# Patient Record
Sex: MALE | Race: WHITE | URBAN - METROPOLITAN AREA
[De-identification: names, ages, dates, MRNs, and addresses within clinical notes are randomized per-mention and may not be internally consistent; named-entity substitution may affect disease eponyms.]

---

## 2017-03-04 ENCOUNTER — HOSPITAL ENCOUNTER (EMERGENCY)
Facility: CLINIC | Age: 32
Discharge: HOME | End: 2017-03-05
Attending: EMERGENCY MEDICINE | Admitting: EMERGENCY MEDICINE
Payer: COMMERCIAL

## 2017-03-04 DIAGNOSIS — R47.81 SLURRED SPEECH: ICD-10-CM

## 2017-03-04 DIAGNOSIS — R11.2 NON-INTRACTABLE VOMITING WITH NAUSEA, UNSPECIFIED VOMITING TYPE: ICD-10-CM

## 2017-03-04 LAB
ALBUMIN SERPL-MCNC: 5.1 G/DL (ref 3.4–5)
ALP SERPL-CCNC: 56 U/L (ref 40–150)
ALT SERPL W P-5'-P-CCNC: 23 U/L (ref 0–70)
ANION GAP SERPL CALCULATED.3IONS-SCNC: 15 MMOL/L (ref 3–14)
AST SERPL W P-5'-P-CCNC: 18 U/L (ref 0–45)
BASOPHILS # BLD AUTO: 0 10E9/L (ref 0–0.2)
BASOPHILS NFR BLD AUTO: 0.3 %
BILIRUB SERPL-MCNC: 1.3 MG/DL (ref 0.2–1.3)
BUN SERPL-MCNC: 16 MG/DL (ref 7–30)
CALCIUM SERPL-MCNC: 9.2 MG/DL (ref 8.5–10.1)
CHLORIDE SERPL-SCNC: 104 MMOL/L (ref 94–109)
CO2 SERPL-SCNC: 21 MMOL/L (ref 20–32)
CREAT SERPL-MCNC: 1.04 MG/DL (ref 0.66–1.25)
DIFFERENTIAL METHOD BLD: ABNORMAL
EOSINOPHIL # BLD AUTO: 0 10E9/L (ref 0–0.7)
EOSINOPHIL NFR BLD AUTO: 0.1 %
ERYTHROCYTE [DISTWIDTH] IN BLOOD BY AUTOMATED COUNT: 12.9 % (ref 10–15)
ETHANOL SERPL-MCNC: <0.01 G/DL
GFR SERPL CREATININE-BSD FRML MDRD: 82 ML/MIN/1.7M2
GLUCOSE SERPL-MCNC: 190 MG/DL (ref 70–99)
HBA1C MFR BLD: 4.8 % (ref 4.3–6)
HCT VFR BLD AUTO: 51 % (ref 40–53)
HGB BLD-MCNC: 17.2 G/DL (ref 13.3–17.7)
IMM GRANULOCYTES # BLD: 0 10E9/L (ref 0–0.4)
IMM GRANULOCYTES NFR BLD: 0.3 %
LYMPHOCYTES # BLD AUTO: 0.4 10E9/L (ref 0.8–5.3)
LYMPHOCYTES NFR BLD AUTO: 2.9 %
MAGNESIUM SERPL-MCNC: 1.6 MG/DL (ref 1.6–2.3)
MCH RBC QN AUTO: 28.7 PG (ref 26.5–33)
MCHC RBC AUTO-ENTMCNC: 33.7 G/DL (ref 31.5–36.5)
MCV RBC AUTO: 85 FL (ref 78–100)
MONOCYTES # BLD AUTO: 0.3 10E9/L (ref 0–1.3)
MONOCYTES NFR BLD AUTO: 2.8 %
NEUTROPHILS # BLD AUTO: 11.2 10E9/L (ref 1.6–8.3)
NEUTROPHILS NFR BLD AUTO: 93.6 %
NRBC # BLD AUTO: 0 10*3/UL
NRBC BLD AUTO-RTO: 0 /100
PLATELET # BLD AUTO: 228 10E9/L (ref 150–450)
POTASSIUM SERPL-SCNC: 3.2 MMOL/L (ref 3.4–5.3)
PROT SERPL-MCNC: 8.6 G/DL (ref 6.8–8.8)
RBC # BLD AUTO: 5.99 10E12/L (ref 4.4–5.9)
SODIUM SERPL-SCNC: 140 MMOL/L (ref 133–144)
TROPONIN I SERPL-MCNC: NORMAL UG/L (ref 0–0.04)
WBC # BLD AUTO: 12 10E9/L (ref 4–11)

## 2017-03-04 PROCEDURE — 85025 COMPLETE CBC W/AUTO DIFF WBC: CPT | Performed by: EMERGENCY MEDICINE

## 2017-03-04 PROCEDURE — 84484 ASSAY OF TROPONIN QUANT: CPT | Performed by: EMERGENCY MEDICINE

## 2017-03-04 PROCEDURE — 96361 HYDRATE IV INFUSION ADD-ON: CPT

## 2017-03-04 PROCEDURE — 80053 COMPREHEN METABOLIC PANEL: CPT | Performed by: EMERGENCY MEDICINE

## 2017-03-04 PROCEDURE — 93005 ELECTROCARDIOGRAM TRACING: CPT

## 2017-03-04 PROCEDURE — 83036 HEMOGLOBIN GLYCOSYLATED A1C: CPT | Performed by: EMERGENCY MEDICINE

## 2017-03-04 PROCEDURE — 99285 EMERGENCY DEPT VISIT HI MDM: CPT | Mod: 25

## 2017-03-04 PROCEDURE — A9585 GADOBUTROL INJECTION: HCPCS | Performed by: EMERGENCY MEDICINE

## 2017-03-04 PROCEDURE — 80320 DRUG SCREEN QUANTALCOHOLS: CPT | Performed by: EMERGENCY MEDICINE

## 2017-03-04 PROCEDURE — 25500064 ZZH RX 255 OP 636: Performed by: EMERGENCY MEDICINE

## 2017-03-04 PROCEDURE — 83735 ASSAY OF MAGNESIUM: CPT | Performed by: EMERGENCY MEDICINE

## 2017-03-04 PROCEDURE — 25000128 H RX IP 250 OP 636: Performed by: EMERGENCY MEDICINE

## 2017-03-04 PROCEDURE — 96374 THER/PROPH/DIAG INJ IV PUSH: CPT

## 2017-03-04 RX ORDER — GADOBUTROL 604.72 MG/ML
10 INJECTION INTRAVENOUS ONCE
Status: DISCONTINUED | OUTPATIENT
Start: 2017-03-04 | End: 2017-03-04 | Stop reason: CLARIF

## 2017-03-04 RX ORDER — GADOBUTROL 604.72 MG/ML
7.5 INJECTION INTRAVENOUS ONCE
Status: COMPLETED | OUTPATIENT
Start: 2017-03-04 | End: 2017-03-04

## 2017-03-04 RX ORDER — ONDANSETRON 2 MG/ML
4 INJECTION INTRAMUSCULAR; INTRAVENOUS ONCE
Status: COMPLETED | OUTPATIENT
Start: 2017-03-04 | End: 2017-03-04

## 2017-03-04 RX ADMIN — SODIUM CHLORIDE 1000 ML: 9 INJECTION, SOLUTION INTRAVENOUS at 22:17

## 2017-03-04 RX ADMIN — GADOBUTROL 7.5 ML: 604.72 INJECTION INTRAVENOUS at 11:25

## 2017-03-04 RX ADMIN — ONDANSETRON 4 MG: 2 INJECTION INTRAMUSCULAR; INTRAVENOUS at 21:46

## 2017-03-04 ASSESSMENT — ENCOUNTER SYMPTOMS
VOMITING: 1
CHILLS: 1
SPEECH DIFFICULTY: 1
NAUSEA: 1
NECK PAIN: 0
HEADACHES: 1

## 2017-03-04 NOTE — LETTER
[facility] EMERGENCY DEPARTMENT  [address]  [facility] [address]  [phone]      2017    Fredo Bruno  [address]  [facility] [id number] [phone] (home) NONE (work)    : 1985      To Whom it may concern:    Fredo Bruno was seen in our Emergency Department today, 2017.  Return to work is based on symptom improvement expected in 1-3 days.      Sincerely,      Michelle Da Silva MD

## 2017-03-04 NOTE — ED AVS SNAPSHOT
[facility] Emergency Department    [address]    [facility] [address]    Phone:  [phone]    Fax:  [phone]                                       Fredo Bruno   MRN: [id number]    Department:  [facility] Emergency Department   Date of Visit:  3/4/2017           Patient Information     Date Of Birth          1985        Your diagnoses for this visit were:     Non-intractable vomiting with nausea, unspecified vomiting type     Slurred speech, resolved        You were seen by Cely Frank MD and Michelle Da Silva MD.      Follow-up Information     Follow up with In clinic.    Contact information:    See list        Follow up with [facility] Emergency Department.    Specialty:  EMERGENCY MEDICINE    Why:  As needed, If symptoms worsen    Contact information:    [address]  [facility] [id number] [phone]        Discharge Instructions         Discharge Instructions  Vomiting    You have been seen today for vomiting. This is usually caused by a virus, but some bacteria, parasites, medicines or other medical conditions can cause similar symptoms. At this time your doctor does not find that your vomiting is a sign of anything dangerous or life-threatening. However, sometimes the signs of serious illness do not show up right away. If you have new or worse symptoms, you may need to be seen again in the emergency department or by your primary doctor. Remember that serious problems like appendicitis can start as vomiting.     Return to the Emergency Department if:    You keep throwing up and you are not able to keep liquids down.     You feel you are getting dehydrated, such as being very thirsty, not urinating at least every 8-12 hours, or feeling faint or lightheaded.     You develop a new fever, or your fever continues for more than 2 days.     You have belly pain that seems worse than cramps, is in one spot, or is getting  worse over time.     You have blood in your vomit or stools.     You feel very weak.    You are not starting to improve within 24 hours of your visit here.     What can I do to help myself?    The most important thing to do is to drink clear liquids. If you have been vomiting a lot, it is best to have only small, frequent sips of liquids. Drinking too much at once may cause more vomiting. If you are vomiting often, you must replace minerals, sodium and potassium lost with your illness. Pedialyte  and sports drinks can help you replace these minerals. You can also drink clear liquids such as water, weak tea, apple juice, and 7-Up . Avoid acid liquids (orange), caffeine (coffee) or alcohol. Do not drink milk until you no longer have diarrhea.     After liquids are staying down, you may start eating mild foods. Soda crackers, toast, plain noodles, gelatin, applesauce and bananas are good first choices. Avoid foods that have acid, are spicy, fatty or have a lot of fiber (such as meats, coarse grains, vegetables). You may start eating these foods again in about 3 days when you are better.     Sometimes treatment includes prescription medicine to prevent nausea and vomiting. If your doctor prescribes these for you, take them as directed.     Don t take ibuprofen, or other nonsteroidal anti-inflammatory medicines without checking with your healthcare provider.   If you were given a prescription for medicine here today, be sure to read all of the information (including the package insert) that comes with your prescription.  This will include important information about the medicine, its side effects, and any warnings that you need to know about.  The pharmacist who fills the prescription can provide more information and answer questions you may have about the medicine.  If you have questions or concerns that the pharmacist cannot address, please call or return to the Emergency Department.     Discharge  Instructions  Headache    You were seen today for a headache. Headaches may be caused by many different things such as muscle tension, sinus inflammation, anxiety and stress, having too little sleep, too much alcohol, some medical conditions or injury. You may have a migraine, which is caused by changes in the blood vessels in your head.  At this time your doctor does not find that your headache is a sign of anything dangerous or life-threatening.  However, sometimes the signs of serious illness do not show up right away.  If you have new or worse symptoms, you may need to be seen again in the emergency department or by your primary doctor.      Return to the Emergency Department if:    You get a fever of 101 F or higher.    Your headache gets much worse.    You get a stiff neck with your headache.    You get a new headache that is different or worse than headaches you have had before.    You are vomiting and can t keep food or water down.    You have blurry or double vision or other problems with your eyes.    You have a new weakness on one side of your body.    You have difficulty with balance which is new.    You or your family thinks you are confused.    You have a seizure or convulsion.    What can I do to help myself?    Pain medications - You may take a pain medication such as Tylenol  (acetaminophen), Advil , Nuprin  (ibuprofen) or Aleve  (naproxen).  If you have been given a narcotic such as Vicodin  (hydrocodone with acetaminophen), Percocet  (oxycodone with acetaminophen), codeine, or a muscle relaxant such as Flexeril  (cyclobenzaprine) or Soma  (carisoprodol), do not drive for four hours after you have taken it. If the narcotic contains Tylenol  (acetaminophen), do not take Tylenol  with it. All narcotics will cause constipation, so eat a high fiber diet.        Take a pain reliever as soon as you notice symptoms.  Starting medications as soon as you start to have symptoms may lessen the amount of pain  you have.    Relaxing in a quiet, dark room may help.    Get enough sleep and eat meals regularly.    Schedule an appointment with your primary physician as instructed, or at least within 1 week.    You may need to watch for certain foods or other things which may trigger your headaches.  Keeping a journal of your headaches and possible triggers may help you and your primary doctor to identify things which you should avoid which may be causing your headaches.  If you were given a prescription for medicine here today, be sure to read all of the information (including the package insert) that comes with your prescription.  This will include important information about the medicine, its side effects, and any warnings that you need to know about.  The pharmacist who fills the prescription can provide more information and answer questions you may have about the medicine.  If you have questions or concerns that the pharmacist cannot address, please call or return to the Emergency Department.       Remember that you can always come back to the Emergency Department if you are not able to see your regular doctor in the amount of time listed above, if you get any new symptoms, or if there is anything that worries you.              24 Hour Appointment Hotline       To make an appointment at any [facility], call [phone] ([phone]). If you don't have a family doctor or clinic, we will help you find one. [facility] are conveniently located to serve the needs of you and your family.             Review of your medicines      START taking        Dose / Directions Last dose taken    ondansetron 4 MG ODT tab   Commonly known as:  ZOFRAN ODT   Dose:  4-8 mg   Quantity:  12 tablet        Take 1-2 tablets (4-8 mg) by mouth every 8 hours as needed for nausea   Refills:  0                Prescriptions were sent or printed at these locations (1 Prescription)                   Other Prescriptions                 Printed at Department/Unit printer (1 of 1)         ondansetron (ZOFRAN ODT) 4 MG ODT tab                Procedures and tests performed during your visit     Alcohol ethyl    CBC with platelets differential    Comprehensive metabolic panel    EKG 12 lead    Hemoglobin A1c    MR Brain w/o & w Contrast    Magnesium    Troponin I      Orders Needing Specimen Collection     None      Pending Results     Date and Time Order Name Status Description    3/4/2017 2139 MR Brain w/o & w Contrast In process     3/4/2017 2104 EKG 12 lead Preliminary             Pending Culture Results     No orders found for last 3 day(s).             Test Results from your hospital stay     3/4/2017  9:52 PM - Interface, Flexilab Results      Component Results     Component Value Ref Range & Units Status    WBC 12.0 (H) 4.0 - 11.0 10e9/L Final    RBC Count 5.99 (H) 4.4 - 5.9 10e12/L Final    Hemoglobin 17.2 13.3 - 17.7 g/dL Final    Hematocrit 51.0 40.0 - 53.0 % Final    MCV 85 78 - 100 fl Final    MCH 28.7 26.5 - 33.0 pg Final    MCHC 33.7 31.5 - 36.5 g/dL Final    RDW 12.9 10.0 - 15.0 % Final    Platelet Count 228 150 - 450 10e9/L Final    Diff Method Automated Method  Final    % Neutrophils 93.6 % Final    % Lymphocytes 2.9 % Final    % Monocytes 2.8 % Final    % Eosinophils 0.1 % Final    % Basophils 0.3 % Final    % Immature Granulocytes 0.3 % Final    Nucleated RBCs 0 0 /100 Final    Absolute Neutrophil 11.2 (H) 1.6 - 8.3 10e9/L Final    Absolute Lymphocytes 0.4 (L) 0.8 - 5.3 10e9/L Final    Absolute Monocytes 0.3 0.0 - 1.3 10e9/L Final    Absolute Eosinophils 0.0 0.0 - 0.7 10e9/L Final    Absolute Basophils 0.0 0.0 - 0.2 10e9/L Final    Abs Immature Granulocytes 0.0 0 - 0.4 10e9/L Final    Absolute Nucleated RBC 0.0  Final         3/4/2017 10:13 PM - Interface, Flexilab Results      Component Results     Component Value Ref Range & Units Status    Sodium 140 133 - 144 mmol/L Final    Potassium 3.2 (L) 3.4 - 5.3 mmol/L Final    Chloride  104 94 - 109 mmol/L Final    Carbon Dioxide 21 20 - 32 mmol/L Final    Anion Gap 15 (H) 3 - 14 mmol/L Final    Glucose 190 (H) 70 - 99 mg/dL Final    Urea Nitrogen 16 7 - 30 mg/dL Final    Creatinine 1.04 0.66 - 1.25 mg/dL Final    GFR Estimate 82 >60 mL/min/1.7m2 Final    Non  GFR Calc    GFR Estimate If Black >90   GFR Calc   >60 mL/min/1.7m2 Final    Calcium 9.2 8.5 - 10.1 mg/dL Final    Bilirubin Total 1.3 0.2 - 1.3 mg/dL Final    Albumin 5.1 (H) 3.4 - 5.0 g/dL Final    Protein Total 8.6 6.8 - 8.8 g/dL Final    Alkaline Phosphatase 56 40 - 150 U/L Final    ALT 23 0 - 70 U/L Final    AST 18 0 - 45 U/L Final         3/4/2017 10:13 PM - Interface, Flexilab Results      Component Results     Component Value Ref Range & Units Status    Troponin I ES  0.000 - 0.045 ug/L Final    <0.015  The 99th percentile for upper reference range is 0.045 ug/L.  Troponin values in   the range of 0.045 - 0.120 ug/L may be associated with risks of adverse   clinical events.           3/5/2017 12:09 AM - Burak Powell      Result not yet available     This result will become available once it has been finished.         3/4/2017 10:13 PM - Interface, Flexilab Results      Component Results     Component Value Ref Range & Units Status    Magnesium 1.6 1.6 - 2.3 mg/dL Final         3/4/2017 10:13 PM - Interface, Flexilab Results      Component Results     Component Value Ref Range & Units Status    Ethanol g/dL <0.01 <0.01 g/dL Final         3/4/2017 10:37 PM - Interface, Flexilab Results      Component Results     Component Value Ref Range & Units Status    Hemoglobin A1C 4.8 4.3 - 6.0 % Final                Clinical Quality Measure: Blood Pressure Screening     Your blood pressure was checked while you were in the emergency department today. The last reading we obtained was  BP: 96/54 . Please read the guidelines below about what these numbers mean and what you should do about them.  If your systolic  "blood pressure (the top number) is less than 120 and your diastolic blood pressure (the bottom number) is less than 80, then your blood pressure is normal. There is nothing more that you need to do about it.  If your systolic blood pressure (the top number) is 120-139 or your diastolic blood pressure (the bottom number) is 80-89, your blood pressure may be higher than it should be. You should have your blood pressure rechecked within a year by a primary care provider.  If your systolic blood pressure (the top number) is 140 or greater or your diastolic blood pressure (the bottom number) is 90 or greater, you may have high blood pressure. High blood pressure is treatable, but if left untreated over time it can put you at risk for heart attack, stroke, or kidney failure. You should have your blood pressure rechecked by a primary care provider within the next 4 weeks.  If your provider in the emergency department today gave you specific instructions to follow-up with your doctor or provider even sooner than that, you should follow that instruction and not wait for up to 4 weeks for your follow-up visit.        Thank you for choosing [facility]       Thank you for choosing [facility] for your care. Our goal is always to provide you with excellent care. Hearing back from our patients is one way we can continue to improve our services. Please take a few minutes to complete the written survey that you may receive in the mail after you visit with us. Thank you!        [facility] Information     [facility] lets you send messages to your doctor, view your test results, renew your prescriptions, schedule appointments and more. To sign up, go to [email or web address] . Click on \"Log in\" on the left side of the screen, which will take you to the Welcome page. Then click on \"Sign up Now\" on the right side of the page.     You will be asked to enter the access code listed below, as well as some personal information. Please follow the " directions to create your username and password.     Your access code is: [email or web address]  Expires: 6/3/2017  1:39 AM     Your access code will  in 90 days. If you need help or a new code, please call your [facility] clinic or [phone].        Care EveryWhere ID     This is your Care EveryWhere ID. This could be used by other organizations to access your [facility] medical records  [id number]        After Visit Summary       This is your record. Keep this with you and show to your community pharmacist(s) and doctor(s) at your next visit.

## 2017-03-04 NOTE — ED AVS SNAPSHOT
[facility] Emergency Department    [address]    [facility] [address]    Phone:  [phone]    Fax:  [phone]                                       Fredo Bruno   MRN: [id number]    Department:  [facility] Emergency Department   Date of Visit:  3/4/2017           After Visit Summary Signature Page     I have received my discharge instructions, and my questions have been answered. I have discussed any challenges I see with this plan with the nurse or doctor.    ..........................................................................................................................................  Patient/Patient Representative Signature      ..........................................................................................................................................  Patient Representative Print Name and Relationship to Patient    ..................................................               ................................................  Date                                            Time    ..........................................................................................................................................  Reviewed by Signature/Title    ...................................................              ..............................................  Date                                                            Time

## 2017-03-05 ENCOUNTER — APPOINTMENT (OUTPATIENT)
Dept: MRI IMAGING | Facility: CLINIC | Age: 32
End: 2017-03-05
Attending: EMERGENCY MEDICINE
Payer: COMMERCIAL

## 2017-03-05 VITALS
OXYGEN SATURATION: 94 % | TEMPERATURE: 97.5 F | SYSTOLIC BLOOD PRESSURE: 96 MMHG | DIASTOLIC BLOOD PRESSURE: 54 MMHG | RESPIRATION RATE: 16 BRPM

## 2017-03-05 LAB — INTERPRETATION ECG - MUSE: NORMAL

## 2017-03-05 PROCEDURE — 70553 MRI BRAIN STEM W/O & W/DYE: CPT

## 2017-03-05 RX ORDER — ONDANSETRON 4 MG/1
4-8 TABLET, ORALLY DISINTEGRATING ORAL EVERY 8 HOURS PRN
Qty: 12 TABLET | Refills: 0 | Status: SHIPPED | OUTPATIENT
Start: 2017-03-05 | End: 2017-03-08

## 2017-03-05 NOTE — DISCHARGE INSTRUCTIONS
Discharge Instructions  Vomiting    You have been seen today for vomiting. This is usually caused by a virus, but some bacteria, parasites, medicines or other medical conditions can cause similar symptoms. At this time your doctor does not find that your vomiting is a sign of anything dangerous or life-threatening. However, sometimes the signs of serious illness do not show up right away. If you have new or worse symptoms, you may need to be seen again in the emergency department or by your primary doctor. Remember that serious problems like appendicitis can start as vomiting.     Return to the Emergency Department if:    You keep throwing up and you are not able to keep liquids down.     You feel you are getting dehydrated, such as being very thirsty, not urinating at least every 8-12 hours, or feeling faint or lightheaded.     You develop a new fever, or your fever continues for more than 2 days.     You have belly pain that seems worse than cramps, is in one spot, or is getting worse over time.     You have blood in your vomit or stools.     You feel very weak.    You are not starting to improve within 24 hours of your visit here.     What can I do to help myself?    The most important thing to do is to drink clear liquids. If you have been vomiting a lot, it is best to have only small, frequent sips of liquids. Drinking too much at once may cause more vomiting. If you are vomiting often, you must replace minerals, sodium and potassium lost with your illness. Pedialyte  and sports drinks can help you replace these minerals. You can also drink clear liquids such as water, weak tea, apple juice, and 7-Up . Avoid acid liquids (orange), caffeine (coffee) or alcohol. Do not drink milk until you no longer have diarrhea.     After liquids are staying down, you may start eating mild foods. Soda crackers, toast, plain noodles, gelatin, applesauce and bananas are good first choices. Avoid foods that have acid, are spicy,  fatty or have a lot of fiber (such as meats, coarse grains, vegetables). You may start eating these foods again in about 3 days when you are better.     Sometimes treatment includes prescription medicine to prevent nausea and vomiting. If your doctor prescribes these for you, take them as directed.     Don t take ibuprofen, or other nonsteroidal anti-inflammatory medicines without checking with your healthcare provider.   If you were given a prescription for medicine here today, be sure to read all of the information (including the package insert) that comes with your prescription.  This will include important information about the medicine, its side effects, and any warnings that you need to know about.  The pharmacist who fills the prescription can provide more information and answer questions you may have about the medicine.  If you have questions or concerns that the pharmacist cannot address, please call or return to the Emergency Department.     Discharge Instructions  Headache    You were seen today for a headache. Headaches may be caused by many different things such as muscle tension, sinus inflammation, anxiety and stress, having too little sleep, too much alcohol, some medical conditions or injury. You may have a migraine, which is caused by changes in the blood vessels in your head.  At this time your doctor does not find that your headache is a sign of anything dangerous or life-threatening.  However, sometimes the signs of serious illness do not show up right away.  If you have new or worse symptoms, you may need to be seen again in the emergency department or by your primary doctor.      Return to the Emergency Department if:    You get a fever of 101 F or higher.    Your headache gets much worse.    You get a stiff neck with your headache.    You get a new headache that is different or worse than headaches you have had before.    You are vomiting and can t keep food or water down.    You have blurry  or double vision or other problems with your eyes.    You have a new weakness on one side of your body.    You have difficulty with balance which is new.    You or your family thinks you are confused.    You have a seizure or convulsion.    What can I do to help myself?    Pain medications - You may take a pain medication such as Tylenol  (acetaminophen), Advil , Nuprin  (ibuprofen) or Aleve  (naproxen).  If you have been given a narcotic such as Vicodin  (hydrocodone with acetaminophen), Percocet  (oxycodone with acetaminophen), codeine, or a muscle relaxant such as Flexeril  (cyclobenzaprine) or Soma  (carisoprodol), do not drive for four hours after you have taken it. If the narcotic contains Tylenol  (acetaminophen), do not take Tylenol  with it. All narcotics will cause constipation, so eat a high fiber diet.        Take a pain reliever as soon as you notice symptoms.  Starting medications as soon as you start to have symptoms may lessen the amount of pain you have.    Relaxing in a quiet, dark room may help.    Get enough sleep and eat meals regularly.    Schedule an appointment with your primary physician as instructed, or at least within 1 week.    You may need to watch for certain foods or other things which may trigger your headaches.  Keeping a journal of your headaches and possible triggers may help you and your primary doctor to identify things which you should avoid which may be causing your headaches.  If you were given a prescription for medicine here today, be sure to read all of the information (including the package insert) that comes with your prescription.  This will include important information about the medicine, its side effects, and any warnings that you need to know about.  The pharmacist who fills the prescription can provide more information and answer questions you may have about the medicine.  If you have questions or concerns that the pharmacist cannot address, please call or return  to the Emergency Department.       Remember that you can always come back to the Emergency Department if you are not able to see your regular doctor in the amount of time listed above, if you get any new symptoms, or if there is anything that worries you.

## 2017-03-05 NOTE — ED PROVIDER NOTES
History     Chief Complaint:  Nausea and Vomiting  Slurred Speech      HPI   Fredo Bruno is a 31 year old male, with a history of migraines, who presents with nausea and vomiting as well as slurred speech. The patient reports that he was shopping with his mom today and ate at [employer] for lunch. He was feeling fine until he got home around 1700 and began vomiting persistently for about 2 hours. He was eventually able to sip ice cold water and eat ice chips prior to arrival here. During this period of vomiting, the patient reports that his limbs locked, his left eye was difficult to open, and he was slurring his speech. He denies ever losing consciousness. On evaluation, he reports a slight frontal headache.  This is much less severe than his migraines and there is no photophobia.  He denies neck pain. Most of his symptoms have resolved although he still feels chilled.  He did not take any medications for it.     Allergies:  Shellfish-derived products    Medications:    The patient is currently on no regular medications.       Past Medical History:    Migraines    Past Surgical History:    Appendectomy    Family History:    Father - MI in his 60s  Mother - Stroke at age 38    Social History:  Marital status: single  Never smoker, negative for alcohol use.  The patient presents with mother.    Review of Systems   Constitutional: Positive for chills.   Gastrointestinal: Positive for nausea and vomiting.   Musculoskeletal: Negative for neck pain.   Neurological: Positive for speech difficulty and headaches.        Negative for loss of consciousness.   All other systems reviewed and are negative.    Physical Exam   First Vitals:  BP: 127/81  Heart Rate: 87  Temp: 97.5  F (36.4  C)  Resp: 16  SpO2: 98 %      Physical Exam  General: Cooperative, lights on in room  Head:  The scalp, face, and head appear normal and symmetric  Eyes:  Sclera white; PERRL; EOMI  ENT:    External ears and nares normal  Neck:  No  meningismus or bruit, full ROM  CV:  Regular rate and rhythm  No murmur   Resp:  Breath sounds clear and equal bilaterally  GI:  Abdomen is soft, non-tender, non-distended  MS:  Moves all extremities  Neuro: Speech is normal and fluent. No apparent deficit    Strength 5/5 x4.  Sensation intact x4.      Cranial nerves II-XII intact by examination.    [employer] Stroke Scale  Exam Interval: Baseline   Score    Level of consciousness: (0)   Alert, keenly responsive    LOC questions: (0)   Answers both questions correctly    LOC commands: (0)   Performs both tasks correctly    Best gaze: (0)   Normal    Visual: (0)   No visual loss    Facial palsy: (0)   Normal symmetrical movements    Motor arm (left): (0)   No drift    Motor arm (right): (0)   No drift    Motor leg (left): (0)   No drift    Motor leg (right): (0)   No drift    Limb ataxia: (0)   Absent    Sensory: (0)   Normal- no sensory loss    Best language: (0)   Normal- no aphasia    Dysarthria: (0)   Normal    Extinction and inattention: (0)   No abnormality        Total Score:  0           Emergency Department Course     Imaging:  Radiographic findings were communicated with the patient who voiced understanding of the findings.    MR Brain, with and without contrast, per radiology:   Formal read pending at time of discharge    Laboratory:  CBC: WBC 12 (H) HGB 17.2 (WNL)  (WNL) RBC 5.99 (H) Absolute Neutrophil 11.2 (H) Absolute Lymphocytes 0.4 (L)  CMP: Creatinine 1.04 (WNL) Glucose 190 (H) Potassium 3.2 (L) Anion Gap 15 (H) Albumin 5.1 (H) Rest WNL    2100: Troponin I: <0.015 (WNL)    Magnesium: 1.6 (WNL)   Alcohol ethyl: <0.01 (WNL)  Hemoglobin A1c: 4.8 (WNL)    Interventions:  2146: Zofran, 4 mg, IV injection  2217: Normal Saline, 1000 mL, IV injection     ED Course:  Nursing notes and vitals reviewed.  I performed an exam of the patient as documented above.     0055: I checked in with and updated the patient.  0105: I spoke to  radiology at the [facility] who kindly previewed the patient's MRI scan and was able to at least confirm that it was negative for stroke.   0117: I spoke to [facility].  0120: I discussed the plan with the patient. He would like to go home.     I personally reviewed the laboratory and imaging results with the patient and answered all related questions prior to discharge.   Findings and plan explained to the patient. Patient discharged home with instructions regarding supportive care, medications, and reasons to return. The importance of close follow-up was reviewed.     Impression & Plan      Medical Decision Making:  Fredo Bruno is a 31 year old male who presents for evaluation of vomiting followed by transient neurologic symptoms. He has a mild headache and certainly not as severe as he has had with migraines in the past. Based on his transient neurologic symptoms, I am concerned for underlying stroke or mass and therefore MRI was obtained.  Complex migraine also possible.  Blood work is consistent with dehydration. Glucose is noted to be elevated, but hemoglobin A1c was added on and returned normal. This may have been a stress response.  He was treated with IV fluids and antiemetics and was able to PO challenge afterwards. Unfortunately, our system that allows us to upload neuroimaging to our night radiologist had malfunctioned. I discussed this with the MRI tech and he has made calls to IT to work on this. In the meantime, I consulted radiology at the [facility] to see if they would be able to tell me anything about the images. They were willing to quickly look through them, but not to provide any formal assessment since we have an agreement with another radiology group. There was a focal area of hyperintensity on the T2 FLAIR image next to the ventricle occipitally and asymmetry in this region.  No obvious stroke on their review of the images. The findings were discussed by  phone with [facility] who felt that the preliminary image review would have been able to definitively identify a stroke. Unfortunately without the images they cannot provide a definitive impression of the abnormality.  This was discussed with the patient and his mother. He has not had any additional vomiting or recurrence of neurologic abnormalities. Their preference is discharge with the understanding that I do not have a full read on the MRI. We will be watching for these results and touching base with them tomorrow.  Antiemetic prescribed.    I asked my partner to watch for a read to return on the night shift.    At 0300 I reviewed the chart.  No read yet on MRI.  I called and spoke to the patient.  Mild headache has since resolved.  Continuing to tolerate oral.  Re-iterated that if symptoms worsen at all and no read yet on MRI to return to ED for re-evaluation and consideration of CT scan which ED physician may be able to interpret even if images cannot be sent to radiology.    Diagnosis:    ICD-10-CM    1. Non-intractable vomiting with nausea, unspecified vomiting type R11.2    2. Slurred speech, resolved R47.81      Disposition:   Discharge to home with primary care follow up.     Discharge Medications:   New Prescriptions    ONDANSETRON (ZOFRAN ODT) 4 MG ODT TAB    Take 1-2 tablets (4-8 mg) by mouth every 8 hours as needed for nausea     I, Mayte Mane, am serving as a scribe on 3/4/2017 at 9:24 PM to personally document services performed by Michelle Da Silva MD, based on my observations and the provider's statements to me.           Michelle Da Silva MD  03/05/17 0153       Michelle Da Silva MD  03/05/17 0238       Michelle Da Silva MD  03/05/17 0307

## 2017-03-05 NOTE — ED NOTES
"Patient arrives to ED via private vehicle, accompanied by mother due to n/v/d and \"seizure like activity\" Patient reports having n/v/d throughout the day.  Had several emesis and approx 3 loose stools. States, \" hands, arms and legs locked up for 45 min\" Patient was aware alert and oriented x4 at the time symptoms developed. Patient reports roommate witnessed event,  And reported \" L eye drooping and weakness\" Denies any HA  ABC intact  Patient is anxious at this time  "